# Patient Record
Sex: MALE | Race: WHITE | NOT HISPANIC OR LATINO | Employment: FULL TIME | URBAN - METROPOLITAN AREA
[De-identification: names, ages, dates, MRNs, and addresses within clinical notes are randomized per-mention and may not be internally consistent; named-entity substitution may affect disease eponyms.]

---

## 2024-08-14 ENCOUNTER — TELEPHONE (OUTPATIENT)
Dept: PLASTIC SURGERY | Facility: CLINIC | Age: 34
End: 2024-08-14

## 2024-08-14 NOTE — TELEPHONE ENCOUNTER
Gary called to set up consult with [facility].     Gary was taking testostone blocker but was getting black outs and blood pressure was low, so had to stop taking it to keep working. To get hormones balanced properly, needs to get orchiectomy as soon as possible.     [employer] [place] Gender care representative reviewed LOS to ensure it meets policy. Gary will upload LOS and insurance information to [employer].

## 2024-08-15 NOTE — TELEPHONE ENCOUNTER
FUTURE VISIT INFORMATION      FUTURE VISIT INFORMATION:  Date: 9/9/24  Time: 1:00pm  Location[place] [facility] [place]  REFERRAL INFORMATION:  Reason for visit/diagnosis  bottom consult    RECORDS REQUESTED FROM:       No recs to collect

## 2024-08-22 ENCOUNTER — DOCUMENTATION ONLY (OUTPATIENT)
Dept: PLASTIC SURGERY | Facility: CLINIC | Age: 34
End: 2024-08-22
Payer: COMMERCIAL

## 2024-08-22 NOTE — PROGRESS NOTES
[facility] :  Care Coordination Note     SITUATION   Gary PERRY is a 34 year old adult who is receiving support for:  Care Team (LOS Review)  .    BACKGROUND     LOS meets criteria (media tab 8/14/24). LOS was written by nurse practitioner with mental health knowledge and diagnostic experience. Writer will follow up with surgeon to see if the LOS in chart will meet criteria or if he will want the LOS from a therapist.     Future consult 9/9/24    ASSESSMENT     Surgery              CGC Assessment  Comprehensive Gender Care (CGC) Enrollment: Enrolled  Patient has a therapist: Yes  Letter of support #1: Received  Letter #1 Date: 08/06/24  Surgery being considered: Yes  Orchiectomy: Yes      PLAN          Nursing Interventions:       Follow-up plan:  Surgeon will approve LOS.       LAWRENCE Breen

## 2024-09-09 ENCOUNTER — OFFICE VISIT (OUTPATIENT)
Dept: PLASTIC SURGERY | Facility: CLINIC | Age: 34
End: 2024-09-09
Payer: COMMERCIAL

## 2024-09-09 ENCOUNTER — PRE VISIT (OUTPATIENT)
Dept: PLASTIC SURGERY | Facility: CLINIC | Age: 34
End: 2024-09-09

## 2024-09-09 VITALS
BODY MASS INDEX: 28.76 KG/M2 | OXYGEN SATURATION: 99 % | HEIGHT: 73 IN | DIASTOLIC BLOOD PRESSURE: 64 MMHG | SYSTOLIC BLOOD PRESSURE: 99 MMHG | HEART RATE: 61 BPM | WEIGHT: 217 LBS

## 2024-09-09 DIAGNOSIS — F64.0 GENDER DYSPHORIA IN ADULT: Primary | ICD-10-CM

## 2024-09-09 PROCEDURE — 99204 OFFICE O/P NEW MOD 45 MIN: CPT | Performed by: NURSE PRACTITIONER

## 2024-09-09 RX ORDER — TRETINOIN 1 MG/G
GEL TOPICAL
COMMUNITY
Start: 2024-07-26

## 2024-09-09 RX ORDER — EZETIMIBE 10 MG/1
TABLET ORAL
COMMUNITY
Start: 2024-01-17

## 2024-09-09 RX ORDER — PROGESTERONE 100 MG/1
CAPSULE ORAL
COMMUNITY
Start: 2024-04-25

## 2024-09-09 RX ORDER — DEXTROAMPHETAMINE SACCHARATE, AMPHETAMINE ASPARTATE, DEXTROAMPHETAMINE SULFATE AND AMPHETAMINE SULFATE 2.5; 2.5; 2.5; 2.5 MG/1; MG/1; MG/1; MG/1
TABLET ORAL
COMMUNITY
Start: 2024-07-06

## 2024-09-09 RX ORDER — FINASTERIDE 1 MG/1
TABLET, FILM COATED ORAL
COMMUNITY
Start: 2024-02-06

## 2024-09-09 RX ORDER — TIRZEPATIDE 2.5 MG/.5ML
INJECTION, SOLUTION SUBCUTANEOUS
COMMUNITY
Start: 2024-09-06

## 2024-09-09 ASSESSMENT — PATIENT HEALTH QUESTIONNAIRE - PHQ9
10. IF YOU CHECKED OFF ANY PROBLEMS, HOW DIFFICULT HAVE THESE PROBLEMS MADE IT FOR YOU TO DO YOUR WORK, TAKE CARE OF THINGS AT HOME, OR GET ALONG WITH OTHER PEOPLE: NOT DIFFICULT AT ALL
SUM OF ALL RESPONSES TO PHQ QUESTIONS 1-9: 4
SUM OF ALL RESPONSES TO PHQ QUESTIONS 1-9: 4

## 2024-09-09 ASSESSMENT — PAIN SCALES - GENERAL: PAINLEVEL: NO PAIN (0)

## 2024-09-09 NOTE — LETTER
"9/9/2024       RE: LIDIA PERRY  [address] [place] [id number]     Dear Colleague,    Thank you for referring your patient, LIDIA PERRY, to the [facility] [place] at [facility]. Please see a copy of my visit note below.        Name: LIDIA PERRY \"Gary\"     MRN: [id number]   YOB: 1990                 Chief Complaint:   Gender Dysphoria            History of Present Illness:   Gary is a 34 year old transgender female seen in consultation for gender dysphoria    Patient transitioned starting in January of 2024  Preferred pronouns are: any pronouns. They state [place] in [place] area so they don't have a preference for hormones  The patient has been on exogenous hormones since: January 17,2024. She is unable to tolerate spironolactone due to symptoms of blacking out, low BP. She is struggling to suppress testoserone and DHT without delmer, so wants orchiectomy asap.  In terms of an intimate relationship and support system, the patient has some supportive friends. and a spouse with whom they still live.  In terms of fertility, the patient: has two children 8 and 2 year old. No desire for more children.    (New)  The patient has obtained one letter of support from their provider. It meets requirements for WPATH and BCBS     (Prior Surgery)  The patient has previously undergone no gender surgeries.     Long-term surgical goals for the patient include: orchiectomy as soon as possible to get rid of testosterone. They eventually want FFS, FD vaginoplasty, and possibly breast aug if needed    The patient is here today expressing interest in orchiectomy as soon as possible.    The patient has begun hair removal. They are doing a full body package with [place].    They have lost weight since December 2023. They started at 300+ lbs and just recently added low dose of Zepbound " "to help with sugar cravings and to break a plateau at 225lbs         Past Medical History:   No past medical history on file.  ADHD  Gender dysphoria  Depression           Past Surgical History:   No past surgical history on file.  No previous surgery         Social History:     Social History     Tobacco Use     Smoking status: Never     Smokeless tobacco: Never   Substance Use Topics     Alcohol use: Not Currently            Family History:   No family history on file.           Allergies:   No Known Allergies         Medications:     Current Outpatient Medications   Medication Sig Dispense Refill     amphetamine-dextroamphetamine (ADDERALL) 10 MG tablet        estradiol cypionate (DEPO-ESTRADIOL) 5 MG/ML injection        ezetimibe (ZETIA) 10 MG tablet        finasteride (PROPECIA) 1 MG tablet        progesterone (PROMETRIUM) 100 MG capsule        tretinoin microspheres (RETIN-A MICRO) 0.1 % external gel apply a SMALL AMOUNT TO SKIN every night. USE every other night IF too drying.       ZEPBOUND 2.5 MG/0.5ML prefilled pen        No current facility-administered medications for this visit.             Review of Systems:    ROS: ROS neg other than the symptoms noted above in the HPI.          Physical Exam:   BP 99/64 (BP Location: Left arm, Patient Position: Chair, Cuff Size: Adult Large)   Pulse 61   Ht 1.854 m (6' 1\")   Wt 98.4 kg (217 lb)   SpO2 99%   BMI 28.63 kg/m    General: age-appropriate in NAD  HEENT: Head AT/NC, EOMI, CN Grossly intact  Resp: no respiratory distress  : circumcised penis; bilateral testicles in scrotum  Neuro: grossly intact      Outside records:    I spent 10 minutes reviewing outside records.         Assessment and Plan:   34 year old transgender female with gender dysphoria    The criteria for genital surgery are specific to the type of surgery being requested.  Criteria for bottom surgery:    1. Persistent, well documented gender dysphoria;  2. Capacity to make a fully " informed decision and to consent for treatment;  3. Age of consent (>18 years old)  4. If significant medical or mental health concerns are present, they must be well controlled.  5. 6 continuous months of hormone therapy as appropriate to the patient s gender goals (unless  the patient has a medical contraindication or is otherwise unable or unwilling to take  Hormones).  6. One letter of support    The aim of hormone therapy prior to gonadectomy is primarily to introduce a period of reversible  estrogen or testosterone suppression, before the patient undergoes irreversible surgical intervention.    I reviewed the steps of orchiectomy. I reviewed the surgical procedure. I reviewed the risks and benefits including bleeding, infection and irreversible nature of the procedure. The patient would like orchiectomy as soon as possible.    We also reviewed that hair removal is a requirement prior to full depth vaginoplasty as the genital skin will be placed in the vaginal cavity. Lack of hair removal would lead to complications related to intravaginal hair. This is nearly impossible to remove postoperatively.    She has a persistent, well documented gender dysphoria. She has capacity to make a fully informed decision and to consent for treatment. Her mental health issues are well controlled. She has been on continuous hormones for years. She has one letter of support which meets requirements.     The patient meets all of these criteria. We discussed that gender affirmation surgery should be considered permanent. We discussed risks/complications of rectal injury, rectovaginal fistula, bleeding, fluid collection, infection, injury to surrounding structures, flap loss, sensory loss, wound dehiscence, vaginal prolapse, vaginal shrinkage/stenosis, need for lifelong dilation, urinary stream abnormalities, DVT/PE and need for revision surgery.     We discussed the option for minimal depth and full depth. She would like to proceed  with orchiectomy asap and then plans eventual FD vaginoplasty      She is ready for prior auth for orchiectomy      Plan: Will start PA process for orchiectomy      JOS Delgado, CNP  [facility]    45 minutes spent on day of encounter doing chart review, history and exam, consultation and education, and additional activities as including in note above.          Again, thank you for allowing me to participate in the care of your patient.      Sincerely,    JOS Alan CNP

## 2024-09-09 NOTE — PROGRESS NOTES
"    Name: TONEYMONO PERRY \"Gary\"     MRN: [id number]   YOB: 1990                 Chief Complaint:   Gender Dysphoria            History of Present Illness:   Gary is a 34 year old transgender female seen in consultation for gender dysphoria    Patient transitioned starting in January of 2024  Preferred pronouns are: any pronouns. They state they lives in a conservative area so they don't have a preference for hormones  The patient has been on exogenous hormones since: January 17,2024. She is unable to tolerate spironolactone due to symptoms of blacking out, low BP. She is struggling to suppress testoserone and DHT without delmer, so wants orchiectomy asap.  In terms of an intimate relationship and support system, the patient has some supportive friends. and a spouse with whom they still live.  In terms of fertility, the patient: has two children 8 and 2 year old. No desire for more children.    (New)  The patient has obtained one letter of support from their provider. It meets requirements for WPATH and BCBS     (Prior Surgery)  The patient has previously undergone no gender surgeries.     Long-term surgical goals for the patient include: orchiectomy as soon as possible to get rid of testosterone. They eventually want FFS, FD vaginoplasty, and possibly breast aug if needed    The patient is here today expressing interest in orchiectomy as soon as possible.    The patient has begun hair removal. They are doing a full body package with [place].    They have lost weight since December 2023. They started at 300+ lbs and just recently added low dose of Zepbound to help with sugar cravings and to break a plateau at 225lbs         Past Medical History:   No past medical history on file.  ADHD  Gender dysphoria  Depression           Past Surgical History:   No past surgical history on file.  No previous surgery         Social History:     Social History     Tobacco Use    Smoking status: Never    " "Smokeless tobacco: Never   Substance Use Topics    Alcohol use: Not Currently            Family History:   No family history on file.           Allergies:   No Known Allergies         Medications:     Current Outpatient Medications   Medication Sig Dispense Refill    amphetamine-dextroamphetamine (ADDERALL) 10 MG tablet       estradiol cypionate (DEPO-ESTRADIOL) 5 MG/ML injection       ezetimibe (ZETIA) 10 MG tablet       finasteride (PROPECIA) 1 MG tablet       progesterone (PROMETRIUM) 100 MG capsule       tretinoin microspheres (RETIN-A MICRO) 0.1 % external gel apply a SMALL AMOUNT TO SKIN every night. USE every other night IF too drying.      ZEPBOUND 2.5 MG/0.5ML prefilled pen        No current facility-administered medications for this visit.             Review of Systems:    ROS: ROS neg other than the symptoms noted above in the HPI.          Physical Exam:   BP 99/64 (BP Location: Left arm, Patient Position: Chair, Cuff Size: Adult Large)   Pulse 61   Ht 1.854 m (6' 1\")   Wt 98.4 kg (217 lb)   SpO2 99%   BMI 28.63 kg/m    General: age-appropriate in NAD  HEENT: Head AT/NC, EOMI, CN Grossly intact  Resp: no respiratory distress  : circumcised penis; bilateral testicles in scrotum  Neuro: grossly intact      Outside records:    I spent 10 minutes reviewing outside records.         Assessment and Plan:   34 year old transgender female with gender dysphoria    The criteria for genital surgery are specific to the type of surgery being requested.  Criteria for bottom surgery:    1. Persistent, well documented gender dysphoria;  2. Capacity to make a fully informed decision and to consent for treatment;  3. Age of consent (>18 years old)  4. If significant medical or mental health concerns are present, they must be well controlled.  5. 6 continuous months of hormone therapy as appropriate to the patient s gender goals (unless  the patient has a medical contraindication or is otherwise unable or unwilling " to take  Hormones).  6. One letter of support    The aim of hormone therapy prior to gonadectomy is primarily to introduce a period of reversible  estrogen or testosterone suppression, before the patient undergoes irreversible surgical intervention.    I reviewed the steps of orchiectomy. I reviewed the surgical procedure. I reviewed the risks and benefits including bleeding, infection and irreversible nature of the procedure. The patient would like orchiectomy as soon as possible.    We also reviewed that hair removal is a requirement prior to full depth vaginoplasty as the genital skin will be placed in the vaginal cavity. Lack of hair removal would lead to complications related to intravaginal hair. This is nearly impossible to remove postoperatively.    She has a persistent, well documented gender dysphoria. She has capacity to make a fully informed decision and to consent for treatment. Her mental health issues are well controlled. She has been on continuous hormones for years. She has one letter of support which meets requirements.     The patient meets all of these criteria. We discussed that gender affirmation surgery should be considered permanent. We discussed risks/complications of rectal injury, rectovaginal fistula, bleeding, fluid collection, infection, injury to surrounding structures, flap loss, sensory loss, wound dehiscence, vaginal prolapse, vaginal shrinkage/stenosis, need for lifelong dilation, urinary stream abnormalities, DVT/PE and need for revision surgery.     We discussed the option for minimal depth and full depth. She would like to proceed with orchiectomy asap and then plans eventual FD vaginoplasty      She is ready for prior auth for orchiectomy      Plan: Will start PA process for orchiectomy      JOS Delgado, CNP  [facility]    45 minutes spent on day of encounter doing chart review, history and exam, consultation and education,  and additional activities as including in note above.

## 2024-09-09 NOTE — LETTER
"[facility] [address] [address] [phone]      September 9, 2024    RE:  \"Gary\" TONEYMONO GERARD PERRY                                                                                                                                                       [address] [place] [address]      To whom it may concern,    This letter is written to detail the medically necessity of hair removal for Gary as part of preoperative preparation for full depth vaginoplasty. Gary was seen in clinic for a full depth vaginoplasty consultation on 09/09/2024. Gary is diagnosed with gender dysphoria  (diagnosis code F64.0) and is a good candidate for full depth vaginoplasty. Hair removal from the genital region is a requirement prior to full depth vaginoplasty, as the genital skin will be placed in the vaginal cavity. Lack of hair removal would lead to complications related to intravaginal hair. Hair removal is nearly impossible to remove postoperatively. Therefore, hair removal prior to vaginoplasty is medically necessary. Hair removal options include Laser Hair Removal (CPT 40432) and/or Electrolysis (CPT 54252).       Sincerely,            JOS Delgado, CNP  Com[facility] Gender Care Program  [facility]          "

## 2024-09-09 NOTE — NURSING NOTE
"Chief Complaint   Patient presents with    Consult     Gary, is being seen today for a consult regarding bottom surgery.       Vitals:    09/09/24 1201   BP: 99/64   BP Location: Left arm   Patient Position: Chair   Cuff Size: Adult Large   Pulse: 61   SpO2: 99%   Weight: 98.4 kg (217 lb)   Height: 1.854 m (6' 1\")       Body mass index is 28.63 kg/m .      Judith Collins LPN    "

## 2024-09-09 NOTE — TELEPHONE ENCOUNTER
FUTURE VISIT INFORMATION      FUTURE VISIT INFORMATION:  Date: 9/23/24  Time: 9:00am  [place] [facility] [place]  REFERRAL INFORMATION:  Reason for visit/diagnosis  bottom consult     RECORDS REQUESTED FROM:         No recs to collect

## 2024-09-23 ENCOUNTER — PRE VISIT (OUTPATIENT)
Dept: PLASTIC SURGERY | Facility: CLINIC | Age: 34
End: 2024-09-23
Payer: COMMERCIAL

## 2024-09-24 DIAGNOSIS — F64.9 GENDER DYSPHORIA: Primary | ICD-10-CM

## 2024-09-24 RX ORDER — ACETAMINOPHEN 325 MG/1
975 TABLET ORAL ONCE
OUTPATIENT
Start: 2024-09-24 | End: 2024-09-24

## 2024-09-25 PROBLEM — F64.9 GENDER DYSPHORIA: Status: ACTIVE | Noted: 2024-09-24

## 2024-10-06 ENCOUNTER — HEALTH MAINTENANCE LETTER (OUTPATIENT)
Age: 34
End: 2024-10-06

## 2024-10-16 ENCOUNTER — TELEPHONE (OUTPATIENT)
Dept: UROLOGY | Facility: CLINIC | Age: 34
End: 2024-10-16
Payer: COMMERCIAL

## 2024-10-16 NOTE — TELEPHONE ENCOUNTER
Left message to schedule surgery with Dr. Mcneil.  Writer left call back number [phone] on the patients voicemail.     Emma Barrera on 10/16/2024 at 6:25 PM

## 2024-10-17 ENCOUNTER — TELEPHONE (OUTPATIENT)
Dept: UROLOGY | Facility: CLINIC | Age: 34
End: 2024-10-17
Payer: COMMERCIAL

## 2024-10-17 NOTE — TELEPHONE ENCOUNTER
Called patient to schedule surgery with Dr. Mcneil    Spoke with: Gary    Date of Surgery: 12/16    Approximate arrival time given:  Yes    Location of surgery: [facility]     Pre-Op H&P: [facility] [place] [place]    Post-Op Appt Date: 12/26 at 0900 - 3 wk virtual with Rosalina     Imaging needed:  No    Discussed with patient pre-op RN will call 2-3 days prior to surgery with arrival time and instructions:  Yes     Packet sent out: Yes 10/17/24  via Mail - Standard with soap      Additional Comments:  NA    All patients questions were answered and was instructed to review surgical packet and call back with any questions or concerns.       Shelley Cervantes on 10/17/2024 at 9:00 AM

## 2024-11-05 ENCOUNTER — TELEPHONE (OUTPATIENT)
Dept: UROLOGY | Facility: CLINIC | Age: 34
End: 2024-11-05
Payer: COMMERCIAL

## 2024-11-05 NOTE — TELEPHONE ENCOUNTER
[facility] Call Center    Phone Message    May a detailed message be left on voicemail: yes     Reason for Call: Other: parimary care calling about surgery and what is needed for clearance, needing office notes and any information   please reach to them     Action Taken: Other: urology    Travel Screening: Not Applicable     Date of Service:

## 2024-11-06 NOTE — PROGRESS NOTES
Called pt to discuss her PCP's request for additional information about her surgery and what is needed for medical clearance.    Pt confirmed her pre-op is scheduled at [facility] with Dr. Monsivais on 11/21/24. Pt stated our team can fax to this clinic information about her surgery, the requested office notes, and what medical clearance is needed for surgery. This RN faxed the requested information to [facility] [employer] [facility] ([phone]).

## 2024-11-14 ENCOUNTER — TELEPHONE (OUTPATIENT)
Dept: UROLOGY | Facility: CLINIC | Age: 34
End: 2024-11-14
Payer: COMMERCIAL

## 2024-11-14 NOTE — TELEPHONE ENCOUNTER
[facility] Call Center    Phone Message    May a detailed message be left on voicemail: yes     Reason for Call: Other: clinic calling needing surgery forms, last office note, anestesia will be used, please reach out to them     Action Taken: Other: urology    Travel Screening: Not Applicable     Date of Service:

## 2024-11-14 NOTE — TELEPHONE ENCOUNTER
Called this clinic back and spoke with Padmini who said that in order to prepare for pt's pre-op appointment, they need to review office notes, information about the surgery, and the type of anesthesia that will be used. Explained that this RN faxed this information to them at [phone] on 11/5/24. Padmini apologized and said they have not yet received this. Writer requested an email address to send the information to. Padmini's email address is: [email or web address]. Will email the information requested this week. Provided writer's direct phone number if there are additional questions.

## 2024-11-15 ENCOUNTER — DOCUMENTATION ONLY (OUTPATIENT)
Dept: PLASTIC SURGERY | Facility: CLINIC | Age: 34
End: 2024-11-15
Payer: COMMERCIAL

## 2024-11-15 NOTE — PROGRESS NOTES
Pt's pre-op history & physical appointment is planned at [facility] on 11/21/24 in preparation for her surgery with Dr Mcneil on 12/16/24. This clinic requested additional information about the procedure, clinical notes, and what type of anesthesia will be used. Pt previously contacted us asking to fax this information to her PCP clinic, which was done. However, the clinic has not yet received this information. Emailed the requested documentation to Padmini who works at pt's PCP clinic: [email or web address].

## 2024-11-18 ENCOUNTER — TELEPHONE (OUTPATIENT)
Dept: PLASTIC SURGERY | Facility: CLINIC | Age: 34
End: 2024-11-18
Payer: COMMERCIAL

## 2024-11-18 NOTE — TELEPHONE ENCOUNTER
Pre and Post Op Patient Education                                       Diagnosis: gender dysphoria  Teaching pre and post op for: bilateral orchiectomy  Person involved in teaching: patient    Patient demonstrates an understanding of the following:  - Date of surgery:  12/16/24 - Monday  - Surgery time: 7:15 am (pt understands that this time could change)  - [place] [facility] [place] [place] [address] [place] [id number]     - Pre-operative history physical: [facility] 11/21/24 - they will fax pre-op documentation to us      - Post-op follow-up:   12/30/24 at 10:00 am (virtual) with Rosalina Arguelles NP. Pt understands needing to be in the [facility] while doing this video visit.    Nicotine use: Pt does not use nicotine.    Patient verbalizes an understanding of the following:  - The need for a responsible adult  and someone to stay with them for the first 24 hours post-operatively: Yes  - NPO per anesthesia guidelines: Yes  - Pre-op showering x2 with Hibiclens/chlorhexidine soap: Yes      Discussed   - Pain management after surgery  - Infection prevention and hand hygiene  - Surgical procedure site care taught  - Signs and symptoms of infection  - Wound care and will be taught at the time of discharge  - Information about how to contact the hospital, nurse, and clinic if needed    Postoperative Plans:  Pt's friend will drive the, to and from surgery since they live 5.5 hours away. They will drive to town and stay in a hotel the day before surgery. 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 Pt will need [facility] paperwork filled out and will send this to us soon.    Surgical instructions given to patient via phone.    Total time with patient: 20 minutes    Jesus Mitchell RN

## 2024-12-10 RX ORDER — ESTRADIOL VALERATE 10 MG/ML
INJECTION INTRAMUSCULAR
COMMUNITY
Start: 2024-11-11

## 2024-12-13 ENCOUNTER — ANESTHESIA EVENT (OUTPATIENT)
Dept: SURGERY | Facility: AMBULATORY SURGERY CENTER | Age: 34
End: 2024-12-13
Payer: COMMERCIAL

## 2024-12-16 ENCOUNTER — ANESTHESIA (OUTPATIENT)
Dept: SURGERY | Facility: AMBULATORY SURGERY CENTER | Age: 34
End: 2024-12-16
Payer: COMMERCIAL

## 2024-12-16 ENCOUNTER — HOSPITAL ENCOUNTER (OUTPATIENT)
Facility: AMBULATORY SURGERY CENTER | Age: 34
Discharge: HOME | End: 2024-12-16
Attending: UROLOGY
Payer: COMMERCIAL

## 2024-12-16 VITALS
HEART RATE: 66 BPM | RESPIRATION RATE: 18 BRPM | SYSTOLIC BLOOD PRESSURE: 102 MMHG | OXYGEN SATURATION: 99 % | BODY MASS INDEX: 28.76 KG/M2 | DIASTOLIC BLOOD PRESSURE: 61 MMHG | TEMPERATURE: 97.3 F | WEIGHT: 217 LBS | HEIGHT: 73 IN

## 2024-12-16 DIAGNOSIS — F64.9 GENDER DYSPHORIA: Primary | ICD-10-CM

## 2024-12-16 RX ORDER — LIDOCAINE 40 MG/G
CREAM TOPICAL
Status: DISCONTINUED | OUTPATIENT
Start: 2024-12-16 | End: 2024-12-17 | Stop reason: HOSPADM

## 2024-12-16 RX ORDER — ONDANSETRON 2 MG/ML
4 INJECTION INTRAMUSCULAR; INTRAVENOUS EVERY 30 MIN PRN
Status: DISCONTINUED | OUTPATIENT
Start: 2024-12-16 | End: 2024-12-17 | Stop reason: HOSPADM

## 2024-12-16 RX ORDER — HYDROMORPHONE HYDROCHLORIDE 1 MG/ML
0.4 INJECTION, SOLUTION INTRAMUSCULAR; INTRAVENOUS; SUBCUTANEOUS EVERY 5 MIN PRN
Status: DISCONTINUED | OUTPATIENT
Start: 2024-12-16 | End: 2024-12-17 | Stop reason: HOSPADM

## 2024-12-16 RX ORDER — NALOXONE HYDROCHLORIDE 0.4 MG/ML
0.1 INJECTION, SOLUTION INTRAMUSCULAR; INTRAVENOUS; SUBCUTANEOUS
Status: DISCONTINUED | OUTPATIENT
Start: 2024-12-16 | End: 2024-12-17 | Stop reason: HOSPADM

## 2024-12-16 RX ORDER — CEFAZOLIN SODIUM 2 G/50ML
2 SOLUTION INTRAVENOUS SEE ADMIN INSTRUCTIONS
Status: DISCONTINUED | OUTPATIENT
Start: 2024-12-16 | End: 2024-12-17 | Stop reason: HOSPADM

## 2024-12-16 RX ORDER — SPIRONOLACTONE 100 MG/1
1 TABLET, FILM COATED ORAL
COMMUNITY
Start: 2024-09-11

## 2024-12-16 RX ORDER — BUPIVACAINE HYDROCHLORIDE AND EPINEPHRINE 5; 5 MG/ML; UG/ML
INJECTION, SOLUTION PERINEURAL PRN
Status: DISCONTINUED | OUTPATIENT
Start: 2024-12-16 | End: 2024-12-16 | Stop reason: HOSPADM

## 2024-12-16 RX ORDER — OXYCODONE HYDROCHLORIDE 5 MG/1
5 TABLET ORAL
Status: DISCONTINUED | OUTPATIENT
Start: 2024-12-16 | End: 2024-12-17 | Stop reason: HOSPADM

## 2024-12-16 RX ORDER — DEXAMETHASONE SODIUM PHOSPHATE 10 MG/ML
4 INJECTION, SOLUTION INTRAMUSCULAR; INTRAVENOUS
Status: DISCONTINUED | OUTPATIENT
Start: 2024-12-16 | End: 2024-12-17 | Stop reason: HOSPADM

## 2024-12-16 RX ORDER — CEFAZOLIN SODIUM 2 G/50ML
2 SOLUTION INTRAVENOUS
Status: COMPLETED | OUTPATIENT
Start: 2024-12-16 | End: 2024-12-16

## 2024-12-16 RX ORDER — ACETAMINOPHEN 325 MG/1
975 TABLET ORAL ONCE
Status: COMPLETED | OUTPATIENT
Start: 2024-12-16 | End: 2024-12-16

## 2024-12-16 RX ORDER — ACETAMINOPHEN 650 MG/1
650 SUPPOSITORY RECTAL ONCE
Status: COMPLETED | OUTPATIENT
Start: 2024-12-16 | End: 2024-12-16

## 2024-12-16 RX ORDER — ACETAMINOPHEN 325 MG/1
975 TABLET ORAL ONCE
Status: DISCONTINUED | OUTPATIENT
Start: 2024-12-16 | End: 2024-12-17 | Stop reason: HOSPADM

## 2024-12-16 RX ORDER — ONDANSETRON 4 MG/1
4 TABLET, ORALLY DISINTEGRATING ORAL EVERY 30 MIN PRN
Status: DISCONTINUED | OUTPATIENT
Start: 2024-12-16 | End: 2024-12-17 | Stop reason: HOSPADM

## 2024-12-16 RX ORDER — LIDOCAINE HYDROCHLORIDE 20 MG/ML
INJECTION, SOLUTION INFILTRATION; PERINEURAL PRN
Status: DISCONTINUED | OUTPATIENT
Start: 2024-12-16 | End: 2024-12-16

## 2024-12-16 RX ORDER — ONDANSETRON 2 MG/ML
INJECTION INTRAMUSCULAR; INTRAVENOUS PRN
Status: DISCONTINUED | OUTPATIENT
Start: 2024-12-16 | End: 2024-12-16

## 2024-12-16 RX ORDER — OXYCODONE HYDROCHLORIDE 5 MG/1
5 TABLET ORAL EVERY 6 HOURS PRN
Qty: 12 TABLET | Refills: 0 | Status: SHIPPED | OUTPATIENT
Start: 2024-12-16 | End: 2024-12-19

## 2024-12-16 RX ORDER — PROPOFOL 10 MG/ML
INJECTION, EMULSION INTRAVENOUS CONTINUOUS PRN
Status: DISCONTINUED | OUTPATIENT
Start: 2024-12-16 | End: 2024-12-16

## 2024-12-16 RX ORDER — DOCUSATE SODIUM 100 MG/1
100 CAPSULE, LIQUID FILLED ORAL DAILY PRN
Qty: 30 CAPSULE | Refills: 0 | Status: SHIPPED | OUTPATIENT
Start: 2024-12-16 | End: 2025-01-15

## 2024-12-16 RX ORDER — DEXAMETHASONE SODIUM PHOSPHATE 4 MG/ML
INJECTION, SOLUTION INTRA-ARTICULAR; INTRALESIONAL; INTRAMUSCULAR; INTRAVENOUS; SOFT TISSUE PRN
Status: DISCONTINUED | OUTPATIENT
Start: 2024-12-16 | End: 2024-12-16

## 2024-12-16 RX ORDER — FENTANYL CITRATE 50 UG/ML
50 INJECTION, SOLUTION INTRAMUSCULAR; INTRAVENOUS EVERY 5 MIN PRN
Status: DISCONTINUED | OUTPATIENT
Start: 2024-12-16 | End: 2024-12-17 | Stop reason: HOSPADM

## 2024-12-16 RX ORDER — FENTANYL CITRATE 50 UG/ML
25 INJECTION, SOLUTION INTRAMUSCULAR; INTRAVENOUS EVERY 5 MIN PRN
Status: DISCONTINUED | OUTPATIENT
Start: 2024-12-16 | End: 2024-12-17 | Stop reason: HOSPADM

## 2024-12-16 RX ORDER — PROPOFOL 10 MG/ML
INJECTION, EMULSION INTRAVENOUS PRN
Status: DISCONTINUED | OUTPATIENT
Start: 2024-12-16 | End: 2024-12-16

## 2024-12-16 RX ORDER — HYDROMORPHONE HYDROCHLORIDE 1 MG/ML
0.2 INJECTION, SOLUTION INTRAMUSCULAR; INTRAVENOUS; SUBCUTANEOUS EVERY 5 MIN PRN
Status: DISCONTINUED | OUTPATIENT
Start: 2024-12-16 | End: 2024-12-17 | Stop reason: HOSPADM

## 2024-12-16 RX ORDER — GLYCOPYRROLATE 0.2 MG/ML
INJECTION, SOLUTION INTRAMUSCULAR; INTRAVENOUS PRN
Status: DISCONTINUED | OUTPATIENT
Start: 2024-12-16 | End: 2024-12-16

## 2024-12-16 RX ORDER — FENTANYL CITRATE 50 UG/ML
INJECTION, SOLUTION INTRAMUSCULAR; INTRAVENOUS PRN
Status: DISCONTINUED | OUTPATIENT
Start: 2024-12-16 | End: 2024-12-16

## 2024-12-16 RX ORDER — OXYCODONE HYDROCHLORIDE 5 MG/1
10 TABLET ORAL
Status: DISCONTINUED | OUTPATIENT
Start: 2024-12-16 | End: 2024-12-17 | Stop reason: HOSPADM

## 2024-12-16 RX ORDER — ALBUTEROL SULFATE 0.83 MG/ML
2.5 SOLUTION RESPIRATORY (INHALATION) EVERY 4 HOURS PRN
Status: DISCONTINUED | OUTPATIENT
Start: 2024-12-16 | End: 2024-12-17 | Stop reason: HOSPADM

## 2024-12-16 RX ORDER — SODIUM CHLORIDE, SODIUM LACTATE, POTASSIUM CHLORIDE, CALCIUM CHLORIDE 600; 310; 30; 20 MG/100ML; MG/100ML; MG/100ML; MG/100ML
INJECTION, SOLUTION INTRAVENOUS CONTINUOUS PRN
Status: DISCONTINUED | OUTPATIENT
Start: 2024-12-16 | End: 2024-12-16

## 2024-12-16 RX ADMIN — ONDANSETRON 4 MG: 2 INJECTION INTRAMUSCULAR; INTRAVENOUS at 07:40

## 2024-12-16 RX ADMIN — FENTANYL CITRATE 100 MCG: 50 INJECTION, SOLUTION INTRAMUSCULAR; INTRAVENOUS at 07:33

## 2024-12-16 RX ADMIN — SODIUM CHLORIDE, SODIUM LACTATE, POTASSIUM CHLORIDE, CALCIUM CHLORIDE: 600; 310; 30; 20 INJECTION, SOLUTION INTRAVENOUS at 07:27

## 2024-12-16 RX ADMIN — CEFAZOLIN SODIUM 2 G: 2 SOLUTION INTRAVENOUS at 07:40

## 2024-12-16 RX ADMIN — PROPOFOL 300 MG: 10 INJECTION, EMULSION INTRAVENOUS at 07:33

## 2024-12-16 RX ADMIN — DEXAMETHASONE SODIUM PHOSPHATE 4 MG: 4 INJECTION, SOLUTION INTRA-ARTICULAR; INTRALESIONAL; INTRAMUSCULAR; INTRAVENOUS; SOFT TISSUE at 07:33

## 2024-12-16 RX ADMIN — PROPOFOL 150 MCG/KG/MIN: 10 INJECTION, EMULSION INTRAVENOUS at 07:33

## 2024-12-16 RX ADMIN — GLYCOPYRROLATE 0.2 MG: 0.2 INJECTION, SOLUTION INTRAMUSCULAR; INTRAVENOUS at 07:40

## 2024-12-16 RX ADMIN — ACETAMINOPHEN 975 MG: 325 TABLET ORAL at 06:52

## 2024-12-16 RX ADMIN — LIDOCAINE HYDROCHLORIDE 100 MG: 20 INJECTION, SOLUTION INFILTRATION; PERINEURAL at 07:33

## 2024-12-16 RX ADMIN — GLYCOPYRROLATE 0.2 MG: 0.2 INJECTION, SOLUTION INTRAMUSCULAR; INTRAVENOUS at 07:48

## 2024-12-16 NOTE — ANESTHESIA PREPROCEDURE EVALUATION
"Anesthesia Pre-Procedure Evaluation    Patient: LIDIA PERRY   MRN: [id number] : 1990        Procedure : Procedure(s):  Bilateral Simple Scrotal Orchiectomy          No past medical history on file.   History reviewed. No pertinent surgical history.   No Known Allergies   Social History     Tobacco Use     Smoking status: Never     Smokeless tobacco: Never   Substance Use Topics     Alcohol use: Not Currently      Wt Readings from Last 1 Encounters:   24 98.4 kg (217 lb)        Anesthesia Evaluation   Pt has had prior anesthetic.     No history of anesthetic complications       ROS/MED HX  ENT/Pulmonary:       Neurologic:  - neg neurologic ROS     Cardiovascular:       METS/Exercise Tolerance: 3 - Able to walk 1-2 blocks without stopping    Hematologic:  - neg hematologic  ROS     Musculoskeletal:  - neg musculoskeletal ROS     GI/Hepatic:  - neg GI/hepatic ROS     Renal/Genitourinary:  - neg Renal ROS     Endo:  - neg endo ROS     Psychiatric/Substance Use:     (+) psychiatric history other (comment) (gender dysphoria)       Infectious Disease:  - neg infectious disease ROS     Malignancy:  - neg malignancy ROS     Other:            Physical Exam    Airway        Mallampati: II   TM distance: > 3 FB   Neck ROM: full   Mouth opening: > 3 cm    Respiratory Devices and Support         Dental       (+) Minor Abnormalities - some fillings, tiny chips      Cardiovascular   cardiovascular exam normal       Rhythm and rate: regular and normal     Pulmonary   pulmonary exam normal        breath sounds clear to auscultation       OUTSIDE LABS:  CBC: No results found for: \"WBC\", \"HGB\", \"HCT\", \"PLT\"  BMP: No results found for: \"NA\", \"POTASSIUM\", \"CHLORIDE\", \"CO2\", \"BUN\", \"CR\", \"GLC\"  COAGS: No results found for: \"PTT\", \"INR\", \"FIBR\"  POC: No results found for: \"BGM\", \"HCG\", \"HCGS\"  HEPATIC: No results found for: \"ALBUMIN\", \"PROTTOTAL\", \"ALT\", \"AST\", \"GGT\", \"ALKPHOS\", \"BILITOTAL\", \"BILIDIRECT\", " "\"FERNNADO\"  OTHER: No results found for: \"PH\", \"LACT\", \"A1C\", \"NAHUM\", \"PHOS\", \"MAG\", \"LIPASE\", \"AMYLASE\", \"TSH\", \"T4\", \"T3\", \"CRP\", \"SED\"    Anesthesia Plan    ASA Status:  1    NPO Status:  NPO Appropriate    Anesthesia Type: General.     - Airway: LMA   Induction: Intravenous, Propofol.   Maintenance: TIVA.        Consents    Anesthesia Plan(s) and associated risks, benefits, and realistic alternatives discussed. Questions answered and patient/representative(s) expressed understanding.     - Discussed:     - Discussed with:  Patient            Postoperative Care    Pain management: Multi-modal analgesia.   PONV prophylaxis: Ondansetron (or other 5HT-3), Dexamethasone or Solumedrol, Background Propofol Infusion     Comments:             Bree Villegas MD    I have reviewed the pertinent notes and labs in the chart from the past 30 days and (re)examined the patient.  Any updates or changes from those notes are reflected in this note.                         # Overweight: Estimated body mass index is 28.63 kg/m  as calculated from the following:    Height as of this encounter: 1.854 m (6' 1\").    Weight as of this encounter: 98.4 kg (217 lb).             "

## 2024-12-16 NOTE — OP NOTE
OPERATIVE REPORT  12/16/24    PREOPERATIVE DIAGNOSIS: Gender Dysphoria  POSTOPERATIVE DIAGNOSIS: Same    PROCEDURES PERFORMED:   bilateral simple scrotal orchiectomy    STAFF SURGEON: Winston Mcneil MD  FELLOW: Eliazar Lynne MD  RESIDENT(S): None  ANESTHESIA: General- LMA    ESTIMATED BLOOD LOSS: 5 mL.   IV FLUIDS: see dictated anesthesia record  COMPLICATIONS: None.   SPECIMEN:      Patient wished to keep their testicles and omit pathologic analysis. Patient completed preoperative form and understands risks of taking a small amount of formalin home with them. Patient understands very very low but real risk of occult undiagnosed malignancy by avoiding pathologic analysis.       SIGNIFICANT FINDINGS:   Ligation of the cord at the level approximately of the external ring; testicles excised en bloc.    BRIEF OPERATIVE INDICATIONS:  LOVELY PERRY  is a 34 year old transgender female wishing to undergo bilateral orchiectomy for gender affirmation.     DESCRIPTION OF PROCEDURE: After full informed voluntary consent was obtained, the patient was transported to the operating room, placed supine on the table. After adequate anesthesia was induced, they were prepped and draped in the usual sterile fashion. A timeout was taken to confirm correct patient, procedure and laterality      We began the procedure by marking a 3 cm scrotal incision beginning at the penoscrotal junction at the midline raphe. Incision was made using a scalpel and electrocautery was used to carry dissection down through the dartos muscle . The left testicle was delivered into the wound. Tunica vaginalis was intact. Blunt and electrocautery dissection was continued proximally to mobilize the cord. A cord block was done with 5 mL 0.5% Marcaine. The cord was dissected superiorly up to the level of the external ring. It was divided into two segments and separately clamped and divided. Each was then stick tied with 0 silk suture. The cord was then divided  sharply. The testicle and spermatic cord were removed.  This was then repeated on the patient's contralateral side to excise the right testicle.  Irrigation was performed and hemostasis was confirmed.     The dartos and septum were closed with a running 3-0 Vicryl suture followed by the skin with running 4-0 monocryl suture in a running horizontal mattress fashion. Bacitracin was applied along with fluffs and a scrotal support.     Patient tolerated the procedure well. No apparent complications. She was transported to the postanesthesia care unit in stable condition    DISPO: PACU then home.   As attending surgeon, IWinston MD, was scrubbed and present for the entire procedure.

## 2024-12-16 NOTE — ANESTHESIA CARE TRANSFER NOTE
Patient: LIDIA PERRY    Procedure: Procedure(s):  Bilateral Simple Scrotal Orchiectomy       Diagnosis: Gender dysphoria [F64.9]  Diagnosis Additional Information: No value filed.    Anesthesia Type:   General     Note:    Oropharynx: oropharynx clear of all foreign objects  Level of Consciousness: awake  Oxygen Supplementation: face mask    Independent Airway: airway patency satisfactory and stable  Dentition: dentition unchanged  Vital Signs Stable: post-procedure vital signs reviewed and stable  Report to RN Given: handoff report given  Patient transferred to: PACU    Handoff Report: Identifed the Patient, Identified the Reponsible Provider, Reviewed the pertinent medical history, Discussed the surgical course, Reviewed Intra-OP anesthesia mangement and issues during anesthesia, Set expectations for post-procedure period and Allowed opportunity for questions and acknowledgement of understanding      Vitals:  Vitals Value Taken Time   /62 12/16/24 0900   Temp 36.3  C (97.3  F) 12/16/24 0900   Pulse 75 12/16/24 0902   Resp 12 12/16/24 0903   SpO2 90 % 12/16/24 0904   Vitals shown include unfiled device data.    Electronically Signed By: JOS Chester CRNA  December 16, 2024  10:01 AM

## 2024-12-16 NOTE — ANESTHESIA POSTPROCEDURE EVALUATION
Patient: LIDIA PERRY    Procedure: Procedure(s):  Bilateral Simple Scrotal Orchiectomy       Anesthesia Type:  General    Note:  Disposition: Outpatient   Postop Pain Control: Uneventful            Sign Out: Well controlled pain   PONV: No   Neuro/Psych: Uneventful            Sign Out: Acceptable/Baseline neuro status   Airway/Respiratory: Uneventful            Sign Out: Acceptable/Baseline resp. status   CV/Hemodynamics: Uneventful            Sign Out: Acceptable CV status; No obvious hypovolemia; No obvious fluid overload   Other NRE: NONE   DID A NON-ROUTINE EVENT OCCUR? No       Last vitals:  Vitals Value Taken Time   /62 12/16/24 0900   Temp 36.3  C (97.3  F) 12/16/24 0900   Pulse 75 12/16/24 0902   Resp 12 12/16/24 0903   SpO2 90 % 12/16/24 0904   Vitals shown include unfiled device data.    Electronically Signed By: Bree Villegas MD  December 16, 2024  9:23 AM

## 2024-12-16 NOTE — DISCHARGE INSTRUCTIONS
Instructions after orchiectomy    Activity  - No strenuous exercise for 6 weeks.  - No lifting, pushing, pulling more than 10 pounds for 6 weeks.   - Do not strain with bowel movements.  - Do not drive until you can press the brake pedal quickly and fully without pain.   - Do not operate a motor vehicle while taking narcotic pain medications.     Incisions  - You may shower and get incisions wet starting 48 hrs after surgery.  - Do not scrub incisions or submerge wounds for 2 weeks or until seen in follow-up.   - Remove wound dressing 48 hours after surgery.   - If purple dermabond glue was used, avoid applying any lotions or ointments.   - If steri-strips were used, they will fall off on their own.   - Leave incision open to air. Cover with gauze only if needed for comfort or to protect clothing from drainage.   - The stitches do not need to be removed, they will dissolve on their own.    Medications  - Transition from narcotic pain medications to tylenol (acetaminophen) as you are able.  Wean yourself off all pain medications as you are able.  - Some pain medications contain both tylenol (acetaminophen) and a narcotic (Norco, vicodin, percocet), do not take more than 4,000mg of Tylenol (acetaminophen) from all sources in any 24 hour period.  - Narcotics can make you constipated. Take fiber (metamucil or benefiber) and stool softeners (miralax, docusate or senna) while taking narcotic pain medications, but stop if you develop diarrhea.  - No driving or operating machinery while taking narcotic pain medications     Follow-Up:  - Call your primary care provider to touch base regarding your recent admission.   - Call or return sooner than your regularly scheduled visit if you develop any of the following: fever (greater than 101.5), uncontrolled pain, uncontrolled nausea or vomiting, as well as increased redness, swelling, or drainage from your wound.     Phone numbers:   - Monday through Friday 8am to 4:30pm: Call  "[phone] with questions or to schedule or confirm appointment.    - Nights or weekends: call the after hours emergency pager - [phone] and tell the  \"I would like to page the Urology Resident on call.\"  - For emergencies, call 911    [facility]  Home Care Following Anesthesia  For 24 hours after surgery:  Get plenty of rest.  A responsible adult must stay with you for at least 24 hours after you leave the surgery center.  Do not drive or use heavy equipment.  If you have weakness or tingling, don't drive or use heavy equipment until this feeling goes away.   Do not drink alcohol.   Avoid strenuous or risky activities.  Ask for help when climbing stairs.  You may feel lightheaded.  IF so, sit for a few minutes before standing.  Have someone help you get up.   If you have nausea (feel sick to your stomach): Drink only clear liquids such as apple juice, ginger ale, broth or 7-Up.  Rest may also help.  Be sure to drink enough fluids.  Move to a regular diet as you feel able.   You may have a slight fever.  Call the doctor if your fever is over 100 F (37.7 C) (taken under the tongue) or lasts longer than 24 hours.  You may have a dry mouth, a sore throat, muscle aches or trouble sleeping. These should go away after 24 hours.  Do not make important or legal decisions.   It is recommended to avoid smoking.        Today you received a Marcaine or bupivacaine block to numb the nerves near your surgery site.  This is a block using local anesthetic or \"numbing\" medication injected around the nerves to anesthetize or \"numb\" the area supplied by those nerves.  This block is injected into the muscle layer near your surgical site.  The medication may numb the location where you had surgery for 6-18 hours, but may last up to 24 hours.  If your surgical site is an arm or leg you should be careful with your affected limb, since it is possible to injure your limb without being " aware of it due to the numbing.  Until full feeling returns, you should guard against bumping or hitting your limb, and avoid extreme hot or cold temperatures on the skin.  As the block wears off, the feeling will return as a tingling or prickly sensation near your surgical site.  You will experience more discomfort from your incision as the feeling returns.  You may want to take a pain pill (a narcotic or Tylenol if this was prescribed by your surgeon) when you start to experience mild pain before the pain beccomes more severe.  If your pain medications do not control your pain you should notifiy your surgeon.    Tips for taking pain medications  To get the best pain relief possible, remember these points:  Take pain medications as directed, before pain becomes severe.  Pain medication can upset your stomach: taking it with food may help.  Constipation is a common side effect of pain medication. Drink plenty of  fluids.  Eat foods high in fiber. Take a stool softener if recommended by your doctor or pharmacist.  Do not drink alcohol, drive or operate machinery while taking pain medications.  Ask about other ways to control pain, such as with heat, ice or relaxation.    Tylenol/Acetaminophen Consumption    If you feel your pain relief is insufficient, you may take Tylenol/Acetaminophen in addition to your narcotic pain medication.   Be careful not to exceed 4,000 mg of Tylenol/Acetaminophen in a 24 hour period from all sources.  If you are taking extra strength Tylenol/acetaminophen (500 mg), the maximum dose is 8 tablets in 24 hours.  If you are taking regular strength acetaminophen (325 mg), the maximum dose is 12 tablets in 24 hours.  Last dose of Tylenol received at 0700, 975mg. May have next dose after 1pm.     Call a doctor for any of the following:  Signs of infection (fever, growing tenderness at the surgery site, a large amount of drainage or bleeding, severe pain, foul-smelling drainage, redness,  swelling).  It has been over 8 to 10 hours since surgery and you are still not able to urinate (pass water).  Headache for over 24 hours.  Numbness, tingling or weakness the day after surgery (if you had spinal anesthesia).  Signs of Covid-19 infection (temperature over 100 degrees, shortness of breath, cough, loss of taste/smell, generalized body aches, persistent headache, chills, sore throat, nausea/vomiting/diarrhea)  Your doctor is:       Dr. Winston Mcneil, Prostate and Urology: [phone]               Or dial [phone] and ask for the resident on call for:  Prostate Urology  For emergency care, call the:  [facility]:  [phone] (TTY for hearing impaired: [phone])

## 2024-12-16 NOTE — ANESTHESIA PROCEDURE NOTES
Airway       Patient location during procedure: OR  Staff -        Anesthesiologist:  Bree Villegas MD       CRNA: Zeinab Barrera APRN CRNA       Performed By: CRNA  Consent for Airway        Urgency: elective  Indications and Patient Condition       Indications for airway management: gavin-procedural       Induction type:intravenous       Mask difficulty assessment: 1 - vent by mask    Final Airway Details       Final airway type: supraglottic airway    Supraglottic Airway Details        Type: LMA       Brand: I-Gel       LMA size: 5    Post intubation assessment        Placement verified by: equal breath sounds and chest rise        Number of attempts at approach: 1       Number of other approaches attempted: 0       Secured with: tape       Ease of procedure: easy       Dentition: Intact and Unchanged

## 2024-12-18 ENCOUNTER — MYC MEDICAL ADVICE (OUTPATIENT)
Dept: PLASTIC SURGERY | Facility: CLINIC | Age: 34
End: 2024-12-18
Payer: COMMERCIAL

## 2024-12-18 NOTE — TELEPHONE ENCOUNTER
Called pt to follow up and discuss the request to call [employer]. Pt clarified that they want us to communicate verbally with April at [employer] about their dates of leave from work. Gary plans to take 12/16-1/12 off from work and will return to work on 1/13/25. This is what Gary would like us to relay to April, and they provided verbal consent to discuss with her on the phone. Gary will provide April with writer's phone number to call.

## 2024-12-26 ENCOUNTER — DOCUMENTATION ONLY (OUTPATIENT)
Dept: UROLOGY | Facility: CLINIC | Age: 34
End: 2024-12-26

## 2024-12-30 ENCOUNTER — TELEPHONE (OUTPATIENT)
Dept: PLASTIC SURGERY | Facility: CLINIC | Age: 34
End: 2024-12-30

## 2025-01-06 ENCOUNTER — VIRTUAL VISIT (OUTPATIENT)
Dept: PLASTIC SURGERY | Facility: CLINIC | Age: 35
End: 2025-01-06
Payer: COMMERCIAL

## 2025-01-06 DIAGNOSIS — Z90.79 STATUS POST ORCHIECTOMY: Primary | ICD-10-CM

## 2025-01-06 NOTE — PROGRESS NOTES
"Virtual Visit:  Patient seen via telephone visit.  Patient is at a friends house in [place]  Provider on site at clinic    SUBJECTIVE:  Gary is a 34 year old transgender female who underwent gender affirming bilateral orchiectomy with Dr Mcneil on 12/16/2024.   She is doing well after surgery. She states she is \"surprised she is doing so well so quickly.\" Some residual soreness in incision area, but soreness in abdomen and inguinal areas are mostly gone.  Incision looks good without wounds or drainage. She is feeling really good. She has not passed out or had any side effects since surgery and stopping spironolactone. She is very happy to be done with spironolactone. She has even been getting back to the gym very slowly.     She is even more certain of her decision to seek vaginoplasty now that she has had orchiectomy. She feels relieved to have her first surgery done and feels less anxiety about further surgeries.  She states \"I feel just pure good.\"  She will be starting electrolysis once she is fully healed to prepare for FD vaginoplasty      OBJECTIVE:  There were no vitals taken for this visit.   General: NAD  : Deferred due to telephone format of visit      ASSESSMENT/PLAN:  S/P bilateral orchiectomy  Healing well  Planning to start hair removal soon  Reviewed next steps of hair removal and eventual hair check with Dr Mcneil. Reviewed contacting us when almost done with hair removal to schedule hair check.  Follow-up odilon Arguelles, APRN, CNP    A total of 25 minutes was spent today with patient, reviewing records, completing charting, and other tasks as detailed above.   "

## 2025-01-06 NOTE — LETTER
"1/6/2025       RE: LIDIA PERRY  [address] [place] [id number]     Dear Colleague,    Thank you for referring your patient, LIDIA PERRY, to the [facility] [place] at [facility]. Please see a copy of my visit note below.    Virtual Visit:  Patient seen via telephone visit.  Patient is at a friends house in [place]  Provider on site at clinic    SUBJECTIVE:  Gary is a 34 year old transgender female who underwent gender affirming bilateral orchiectomy with Dr Mcneil on 12/16/2024.   She is doing well after surgery. She states she is \"surprised she is doing so well so quickly.\" Some residual soreness in incision area, but soreness in abdomen and inguinal areas are mostly gone.  Incision looks good without wounds or drainage. She is feeling really good. She has not passed out or had any side effects since surgery and stopping spironolactone. She is very happy to be done with spironolactone. She has even been getting back to the gym very slowly.     She is even more certain of her decision to seek vaginoplasty now that she has had orchiectomy. She feels relieved to have her first surgery done and feels less anxiety about further surgeries.  She states \"I feel just pure good.\"  She will be starting electrolysis once she is fully healed to prepare for FD vaginoplasty      OBJECTIVE:  There were no vitals taken for this visit.   General: NAD  : Deferred due to telephone format of visit      ASSESSMENT/PLAN:  S/P bilateral orchiectomy  Healing well  Planning to start hair removal soon  Reviewed next steps of hair removal and eventual hair check with Dr Mcneil. Reviewed contacting us when almost done with hair removal to schedule hair check.  Follow-up prn      Rosalina Arguelles, APRN, CNP    A total of 25 minutes was spent today with patient, reviewing records, completing charting, and other tasks as " detailed above.       Again, thank you for allowing me to participate in the care of your patient.      Sincerely,    JOS Alan CNP

## 2025-01-08 NOTE — PROGRESS NOTES
Type of Form Received: FMLA/STD    Form Received (Date) 12/26/24   Form Filled out Yes, date 12/27/24   Placed in provider folder Yes       Received Completed forms Yes   Faxed Forms Faxed To: [employer]  Fax Number: [phone]   Sent to HIM (Date) 1/8/25

## (undated) DEVICE — GLOVE BIOGEL PI MICRO SZ 7.5 48575

## (undated) DEVICE — LINEN TOWEL PACK X5 5464

## (undated) DEVICE — DRSG KERLIX FLUFFS X5

## (undated) DEVICE — SU VICRYL+ 3-0 27IN SH UND VCP416H

## (undated) DEVICE — SOL NACL 0.9% IRRIG 500ML BOTTLE 2F7123

## (undated) DEVICE — PANTIES MESH LG/XLG 2PK 706M2

## (undated) DEVICE — PAD CHUX UNDERPAD 30X36" P3036C

## (undated) DEVICE — RX BACITRACIN OINTMENT 14G 0.5OZ 45802-060-01

## (undated) DEVICE — PREP CHLORAPREP 26ML TINTED HI-LITE ORANGE 930815

## (undated) DEVICE — DRAPE LAP W/ARMBOARD 29410

## (undated) DEVICE — SU SILK 0 SH 30" K834H

## (undated) DEVICE — ESU GROUND PAD ADULT W/CORD E7507

## (undated) DEVICE — BLADE CLIPPER DISP 4406

## (undated) DEVICE — PACK MINOR CUSTOM ASC

## (undated) DEVICE — SU MONOCRYL 4-0 PS-2 27" UND Y426H

## (undated) RX ORDER — FENTANYL CITRATE 50 UG/ML
INJECTION, SOLUTION INTRAMUSCULAR; INTRAVENOUS
Status: DISPENSED
Start: 2024-12-16

## (undated) RX ORDER — BUPIVACAINE HYDROCHLORIDE 2.5 MG/ML
INJECTION, SOLUTION EPIDURAL; INFILTRATION; INTRACAUDAL
Status: DISPENSED
Start: 2024-12-16

## (undated) RX ORDER — DEXAMETHASONE SODIUM PHOSPHATE 4 MG/ML
INJECTION, SOLUTION INTRA-ARTICULAR; INTRALESIONAL; INTRAMUSCULAR; INTRAVENOUS; SOFT TISSUE
Status: DISPENSED
Start: 2024-12-16

## (undated) RX ORDER — PROPOFOL 10 MG/ML
INJECTION, EMULSION INTRAVENOUS
Status: DISPENSED
Start: 2024-12-16

## (undated) RX ORDER — GLYCOPYRROLATE 0.2 MG/ML
INJECTION INTRAMUSCULAR; INTRAVENOUS
Status: DISPENSED
Start: 2024-12-16

## (undated) RX ORDER — ONDANSETRON 2 MG/ML
INJECTION INTRAMUSCULAR; INTRAVENOUS
Status: DISPENSED
Start: 2024-12-16

## (undated) RX ORDER — ACETAMINOPHEN 325 MG/1
TABLET ORAL
Status: DISPENSED
Start: 2024-12-16

## (undated) RX ORDER — EPINEPHRINE 1 MG/ML
INJECTION, SOLUTION INTRAMUSCULAR; SUBCUTANEOUS
Status: DISPENSED
Start: 2024-12-16

## (undated) RX ORDER — DEXMEDETOMIDINE HYDROCHLORIDE 4 UG/ML
INJECTION, SOLUTION INTRAVENOUS
Status: DISPENSED
Start: 2024-12-16

## (undated) RX ORDER — BUPIVACAINE HYDROCHLORIDE 5 MG/ML
INJECTION, SOLUTION EPIDURAL; INTRACAUDAL
Status: DISPENSED
Start: 2024-12-16

## (undated) RX ORDER — CEFAZOLIN SODIUM 2 G/50ML
SOLUTION INTRAVENOUS
Status: DISPENSED
Start: 2024-12-16